# Patient Record
Sex: FEMALE | URBAN - METROPOLITAN AREA
[De-identification: names, ages, dates, MRNs, and addresses within clinical notes are randomized per-mention and may not be internally consistent; named-entity substitution may affect disease eponyms.]

---

## 2020-01-23 ENCOUNTER — HOSPITAL ENCOUNTER (OUTPATIENT)
Dept: LAB | Age: 25
Discharge: HOME | End: 2020-01-23

## 2020-01-23 ENCOUNTER — OFFICE VISIT (OUTPATIENT)
Dept: FAMILY MEDICINE CLINIC | Age: 25
End: 2020-01-23

## 2020-01-23 VITALS
TEMPERATURE: 98.6 F | OXYGEN SATURATION: 99 % | WEIGHT: 150 LBS | DIASTOLIC BLOOD PRESSURE: 67 MMHG | HEIGHT: 65 IN | HEART RATE: 66 BPM | RESPIRATION RATE: 18 BRPM | BODY MASS INDEX: 24.99 KG/M2 | SYSTOLIC BLOOD PRESSURE: 109 MMHG

## 2020-01-23 DIAGNOSIS — K58.1 IRRITABLE BOWEL SYNDROME WITH CONSTIPATION: Primary | ICD-10-CM

## 2020-01-23 DIAGNOSIS — Z28.21 INFLUENZA VACCINATION DECLINED: ICD-10-CM

## 2020-01-23 DIAGNOSIS — K58.1 IRRITABLE BOWEL SYNDROME WITH CONSTIPATION: ICD-10-CM

## 2020-01-23 DIAGNOSIS — Z00.00 ENCOUNTER FOR MEDICAL EXAMINATION TO ESTABLISH CARE: ICD-10-CM

## 2020-01-23 DIAGNOSIS — F32.9 MAJOR DEPRESSIVE DISORDER WITH CURRENT ACTIVE EPISODE, UNSPECIFIED DEPRESSION EPISODE SEVERITY, UNSPECIFIED WHETHER RECURRENT: ICD-10-CM

## 2020-01-23 RX ORDER — ETONOGESTREL AND ETHINYL ESTRADIOL 11.7; 2.7 MG/1; MG/1
INSERT, EXTENDED RELEASE VAGINAL
COMMUNITY

## 2020-01-23 NOTE — PROGRESS NOTES
Chief Complaint   Patient presents with   [facility].       Golden Miner is a 30 y.o. female who presents to establish care. She would like to get labs done as requested by her natural medicine provider Dr. Aspen Arrington ([facility]). Pt states she hasn't had a PCP since she was in high school and has preferred to manage her IBS (constipation predominant) and MDD w/ natural medicine. She would like a referral to psychology for counseling. Nuva ring  LMP: 18; regular monthly cycles. . Follows w/ [facility] where she gets her paps done; can't recall last pap. She reports having HPV vaccinations but doesn't currently have record of them    PMHx:  History reviewed. No pertinent past medical history. Meds:   Current Outpatient Medications   Medication Sig Dispense Refill    ethinyl estradiol-etonogestrel (NUVARING) 0.12-0.015 mg/24 hr vaginal ring by Intravaginal route. Allergies: Allergies   Allergen Reactions    Ceclor [Cefaclor] Unknown (comments)       Smoker:  Social History     Tobacco Use   Smoking Status Never Smoker   Smokeless Tobacco Never Used       ETOH:   Social History     Substance and Sexual Activity   Alcohol Use Yes       FH:   Family History   Problem Relation Age of Onset    Cancer Maternal Aunt         breast cancer    Cancer Paternal Grandfather         prostat       Review of Systems   Constitutional: Negative for chills and fever. HENT: Negative for congestion and sinus pain. Eyes: Negative for pain and discharge. Respiratory: Positive for cough. Negative for shortness of breath. Residual mild non-productive cough from having the flu last week   Gastrointestinal: Negative for constipation, diarrhea, nausea and vomiting. Musculoskeletal: Negative for myalgias. Skin: Negative for rash. Neurological: Negative for weakness and headaches. Psychiatric/Behavioral: Positive for depression. Negative for substance abuse and suicidal ideas. All other systems reviewed and are negative. Physical Exam:  Visit Vitals  /67 (BP 1 Location: Right arm, BP Patient Position: Sitting)   Pulse 66   Temp 98.6 °F (37 °C) (Oral)   Resp 18   Ht 5' 4.57\" (1.64 m)   Wt 150 lb (68 kg)   LMP 01/12/2020 (Exact Date)   SpO2 99%   BMI 25.30 kg/m²     Physical Exam  Vitals signs and nursing note reviewed. Constitutional:       Appearance: Normal appearance. Cardiovascular:      Rate and Rhythm: Normal rate and regular rhythm. Heart sounds: Normal heart sounds. Pulmonary:      Effort: Pulmonary effort is normal.      Breath sounds: Normal breath sounds. Neurological:      General: No focal deficit present. Mental Status: She is alert and oriented to person, place, and time. Psychiatric:         Mood and Affect: Mood normal.         Behavior: Behavior normal.         Thought Content: Thought content normal.         Assessment:    ICD-10-CM ICD-9-CM    1. Irritable bowel syndrome with constipation K58.1 564.1 CBC W/O DIFF      METABOLIC PANEL, COMPREHENSIVE      LIPID PANEL      TSH 3RD GENERATION      VITAMIN D, 25 HYDROXY      FERRITIN   2. Encounter for medical examination to establish care Z00.00 V70.9 CBC W/O DIFF      METABOLIC PANEL, COMPREHENSIVE      LIPID PANEL      TSH 3RD GENERATION      VITAMIN D, 25 HYDROXY      FERRITIN      TETANUS, DIPHTHERIA TOXOIDS AND ACELLULAR PERTUSSIS VACCINE (TDAP), IN INDIVIDS. >=7, IM      LA IMMUNIZ ADMIN,1 SINGLE/COMB VAC/TOXOID   3. Major depressive disorder with current active episode, unspecified depression episode severity, unspecified whether recurrent F32.9 296.30 REFERRAL TO PSYCHOLOGY      LA PATIENT SCREENED FOR DEPRESSION   4. Influenza vaccination declined Z28.21 V64.06        Plan:    1. Encounter for medical examination to establish care  - CBC W/O DIFF; Future  - METABOLIC PANEL, COMPREHENSIVE; Future  - LIPID PANEL; Future  - TSH 3RD GENERATION;  Future  - VITAMIN D, 25 HYDROXY; Future  - FERRITIN; Future  - TETANUS, DIPHTHERIA TOXOIDS AND ACELLULAR PERTUSSIS VACCINE (TDAP), IN INDIVIDS. >=7, IM  - [place] IMMUNIZ ADMIN,1 SINGLE/COMB VAC/TOXOID  - Pt to release OB/GYN records + HPV vaccination records  - Declined flu shot    2. Irritable bowel syndrome with constipation  - CBC W/O DIFF; Future  - METABOLIC PANEL, COMPREHENSIVE; Future  - LIPID PANEL; Future  - TSH 3RD GENERATION; Future  - VITAMIN D, 25 HYDROXY; Future  - FERRITIN; Future    3. Major depressive disorder with current active episode, unspecified depression episode severity, unspecified whether recurrent  PHQ9: 7.  GAD7: 6  - REFERRAL TO PSYCHOLOGY  - List of psychologists provided to the patient  - Offered pt to medically manage if she desires, but she prefers to follow-up w/ natural medicine      Pt seen and discussed w/ Dr. Jose Weber, Family Medicine Attending    Jen Joe MD  Family Medicine Resident

## 2020-01-23 NOTE — PATIENT INSTRUCTIONS

## 2020-01-24 LAB
25(OH)D3 SERPL-MCNC: 27.8 NG/ML (ref 30–100)
ALBUMIN SERPL-MCNC: 3.7 G/DL (ref 3.5–5)
ALBUMIN/GLOB SERPL: 1.2 {RATIO} (ref 1.1–2.2)
ALP SERPL-CCNC: 34 U/L (ref 45–117)
ALT SERPL-CCNC: 28 U/L (ref 12–78)
ANION GAP SERPL CALC-SCNC: 5 MMOL/L (ref 5–15)
AST SERPL-CCNC: 27 U/L (ref 15–37)
BILIRUB SERPL-MCNC: 0.4 MG/DL (ref 0.2–1)
BUN SERPL-MCNC: 10 MG/DL (ref 6–20)
BUN/CREAT SERPL: 14 (ref 12–20)
CALCIUM SERPL-MCNC: 8.6 MG/DL (ref 8.5–10.1)
CHLORIDE SERPL-SCNC: 109 MMOL/L (ref 97–108)
CHOLEST SERPL-MCNC: 156 MG/DL
CO2 SERPL-SCNC: 27 MMOL/L (ref 21–32)
CREAT SERPL-MCNC: 0.73 MG/DL (ref 0.55–1.02)
ERYTHROCYTE [DISTWIDTH] IN BLOOD BY AUTOMATED COUNT: 11.9 % (ref 11.5–14.5)
FERRITIN SERPL-MCNC: 38 NG/ML (ref 26–388)
GLOBULIN SER CALC-MCNC: 3.2 G/DL (ref 2–4)
GLUCOSE SERPL-MCNC: 83 MG/DL (ref 65–100)
HCT VFR BLD AUTO: 38.9 % (ref 35–47)
HDLC SERPL-MCNC: 64 MG/DL
HDLC SERPL: 2.4 {RATIO} (ref 0–5)
HGB BLD-MCNC: 12.4 G/DL (ref 11.5–16)
LDLC SERPL CALC-MCNC: 73.2 MG/DL (ref 0–100)
LIPID PROFILE,FLP: NORMAL
MCH RBC QN AUTO: 30 PG (ref 26–34)
MCHC RBC AUTO-ENTMCNC: 31.9 G/DL (ref 30–36.5)
MCV RBC AUTO: 94.2 FL (ref 80–99)
NRBC # BLD: 0 K/UL (ref 0–0.01)
NRBC BLD-RTO: 0 PER 100 WBC
PLATELET # BLD AUTO: 193 K/UL (ref 150–400)
PMV BLD AUTO: 11.5 FL (ref 8.9–12.9)
POTASSIUM SERPL-SCNC: 4.4 MMOL/L (ref 3.5–5.1)
PROT SERPL-MCNC: 6.9 G/DL (ref 6.4–8.2)
RBC # BLD AUTO: 4.13 M/UL (ref 3.8–5.2)
SODIUM SERPL-SCNC: 141 MMOL/L (ref 136–145)
TRIGL SERPL-MCNC: 94 MG/DL (ref ?–150)
TSH SERPL DL<=0.05 MIU/L-ACNC: 1.25 UIU/ML (ref 0.36–3.74)
VLDLC SERPL CALC-MCNC: 18.8 MG/DL
WBC # BLD AUTO: 5.1 K/UL (ref 3.6–11)